# Patient Record
Sex: FEMALE | Race: WHITE | NOT HISPANIC OR LATINO | URBAN - METROPOLITAN AREA
[De-identification: names, ages, dates, MRNs, and addresses within clinical notes are randomized per-mention and may not be internally consistent; named-entity substitution may affect disease eponyms.]

---

## 2021-02-12 DIAGNOSIS — Z23 ENCOUNTER FOR IMMUNIZATION: ICD-10-CM

## 2023-06-27 ENCOUNTER — NEW PATIENT (OUTPATIENT)
Dept: URBAN - METROPOLITAN AREA CLINIC 14 | Facility: CLINIC | Age: 78
End: 2023-06-27

## 2023-06-27 DIAGNOSIS — H35.033: ICD-10-CM

## 2023-06-27 DIAGNOSIS — H35.3211: ICD-10-CM

## 2023-06-27 DIAGNOSIS — H35.3122: ICD-10-CM

## 2023-06-27 DIAGNOSIS — H43.813: ICD-10-CM

## 2023-06-27 DIAGNOSIS — D31.32: ICD-10-CM

## 2023-06-27 PROCEDURE — 92202 OPSCPY EXTND ON/MAC DRAW: CPT | Mod: 59

## 2023-06-27 PROCEDURE — 92134 CPTRZ OPH DX IMG PST SGM RTA: CPT

## 2023-06-27 PROCEDURE — 99204 OFFICE O/P NEW MOD 45 MIN: CPT | Mod: 25

## 2023-06-27 PROCEDURE — 67028 INJECTION EYE DRUG: CPT

## 2023-06-27 PROCEDURE — PFS EYLEA PFS

## 2023-06-27 ASSESSMENT — VISUAL ACUITY
OD_PAM: 20/20
OD_CC: 20/60-3
OS_AM: 20/20
OS_CC: 20/30-1

## 2023-06-27 ASSESSMENT — TONOMETRY
OS_IOP_MMHG: 19
OD_IOP_MMHG: 18

## 2023-08-04 ENCOUNTER — FOLLOW UP (OUTPATIENT)
Dept: URBAN - METROPOLITAN AREA CLINIC 14 | Facility: CLINIC | Age: 78
End: 2023-08-04

## 2023-08-04 DIAGNOSIS — D31.32: ICD-10-CM

## 2023-08-04 DIAGNOSIS — H35.033: ICD-10-CM

## 2023-08-04 DIAGNOSIS — H35.3122: ICD-10-CM

## 2023-08-04 DIAGNOSIS — H35.3211: ICD-10-CM

## 2023-08-04 PROCEDURE — PFS EYLEA PFS: Mod: JZ

## 2023-08-04 PROCEDURE — 92202 OPSCPY EXTND ON/MAC DRAW: CPT | Mod: 59

## 2023-08-04 PROCEDURE — 92014 COMPRE OPH EXAM EST PT 1/>: CPT | Mod: 25

## 2023-08-04 PROCEDURE — 67028 INJECTION EYE DRUG: CPT

## 2023-08-04 PROCEDURE — 92134 CPTRZ OPH DX IMG PST SGM RTA: CPT

## 2023-08-04 ASSESSMENT — TONOMETRY
OS_IOP_MMHG: 14
OD_IOP_MMHG: 15

## 2023-08-04 ASSESSMENT — VISUAL ACUITY
OS_CC: 20/40
OD_CC: 20/60-1

## 2023-09-01 ENCOUNTER — FOLLOW UP (OUTPATIENT)
Dept: URBAN - METROPOLITAN AREA CLINIC 14 | Facility: CLINIC | Age: 78
End: 2023-09-01

## 2023-09-01 DIAGNOSIS — H35.3122: ICD-10-CM

## 2023-09-01 DIAGNOSIS — H35.3211: ICD-10-CM

## 2023-09-01 PROCEDURE — PFS EYLEA PFS: Mod: JZ

## 2023-09-01 PROCEDURE — 92014 COMPRE OPH EXAM EST PT 1/>: CPT | Mod: 25

## 2023-09-01 PROCEDURE — 92134 CPTRZ OPH DX IMG PST SGM RTA: CPT

## 2023-09-01 PROCEDURE — 67028 INJECTION EYE DRUG: CPT

## 2023-09-01 PROCEDURE — 92202 OPSCPY EXTND ON/MAC DRAW: CPT | Mod: 59

## 2023-09-01 ASSESSMENT — VISUAL ACUITY
OD_CC: 20/60-3
OD_PH: 20/50
OS_CC: 20/40-1

## 2023-09-01 ASSESSMENT — TONOMETRY
OD_IOP_MMHG: 15
OS_IOP_MMHG: 15

## 2023-09-29 ENCOUNTER — FOLLOW UP (OUTPATIENT)
Dept: URBAN - METROPOLITAN AREA CLINIC 14 | Facility: CLINIC | Age: 78
End: 2023-09-29

## 2023-09-29 DIAGNOSIS — H35.3122: ICD-10-CM

## 2023-09-29 DIAGNOSIS — H35.3211: ICD-10-CM

## 2023-09-29 PROCEDURE — 92134 CPTRZ OPH DX IMG PST SGM RTA: CPT

## 2023-09-29 PROCEDURE — 92202 OPSCPY EXTND ON/MAC DRAW: CPT | Mod: 59

## 2023-09-29 PROCEDURE — PFS EYLEA PFS: Mod: JZ

## 2023-09-29 PROCEDURE — 67028 INJECTION EYE DRUG: CPT

## 2023-09-29 PROCEDURE — 92014 COMPRE OPH EXAM EST PT 1/>: CPT | Mod: 25

## 2023-09-29 ASSESSMENT — VISUAL ACUITY
OD_PH: 20/40
OD_CC: 20/80+2
OS_CC: 20/40-1
OS_PH: 20/30

## 2023-09-29 ASSESSMENT — TONOMETRY
OS_IOP_MMHG: 20
OD_IOP_MMHG: 17

## 2023-11-07 ENCOUNTER — FOLLOW UP (OUTPATIENT)
Dept: URBAN - METROPOLITAN AREA CLINIC 14 | Facility: CLINIC | Age: 78
End: 2023-11-07

## 2023-11-07 DIAGNOSIS — H35.3122: ICD-10-CM

## 2023-11-07 DIAGNOSIS — H35.033: ICD-10-CM

## 2023-11-07 DIAGNOSIS — H35.3211: ICD-10-CM

## 2023-11-07 PROCEDURE — HD EYLEA HD 8MG: Mod: JZ

## 2023-11-07 PROCEDURE — 92202 OPSCPY EXTND ON/MAC DRAW: CPT | Mod: 59

## 2023-11-07 PROCEDURE — 92134 CPTRZ OPH DX IMG PST SGM RTA: CPT

## 2023-11-07 PROCEDURE — 92014 COMPRE OPH EXAM EST PT 1/>: CPT | Mod: 25

## 2023-11-07 PROCEDURE — 67028 INJECTION EYE DRUG: CPT

## 2023-11-07 ASSESSMENT — TONOMETRY
OD_IOP_MMHG: 13
OS_IOP_MMHG: 14

## 2023-11-07 ASSESSMENT — VISUAL ACUITY
OS_PH: 20/30-2
OD_PH: 20/40
OD_CC: 20/60-2
OS_CC: 20/50

## 2023-12-12 ENCOUNTER — FOLLOW UP (OUTPATIENT)
Dept: URBAN - METROPOLITAN AREA CLINIC 14 | Facility: CLINIC | Age: 78
End: 2023-12-12

## 2023-12-12 DIAGNOSIS — H35.3122: ICD-10-CM

## 2023-12-12 DIAGNOSIS — H35.3211: ICD-10-CM

## 2023-12-12 DIAGNOSIS — H35.033: ICD-10-CM

## 2023-12-12 PROCEDURE — 92014 COMPRE OPH EXAM EST PT 1/>: CPT | Mod: 25

## 2023-12-12 PROCEDURE — 92202 OPSCPY EXTND ON/MAC DRAW: CPT | Mod: 59

## 2023-12-12 PROCEDURE — 92134 CPTRZ OPH DX IMG PST SGM RTA: CPT

## 2023-12-12 PROCEDURE — 67028 INJECTION EYE DRUG: CPT

## 2023-12-12 PROCEDURE — HD EYLEA HD 8MG: Mod: JZ

## 2023-12-12 ASSESSMENT — VISUAL ACUITY
OS_PH: 20/40-2
OS_CC: 20/50
OD_PH: 20/50+3
OD_CC: 20/60

## 2023-12-12 ASSESSMENT — TONOMETRY
OS_IOP_MMHG: 15
OD_IOP_MMHG: 14

## 2024-01-26 ENCOUNTER — FOLLOW UP (OUTPATIENT)
Dept: URBAN - METROPOLITAN AREA CLINIC 14 | Facility: CLINIC | Age: 79
End: 2024-01-26

## 2024-01-26 DIAGNOSIS — H35.3122: ICD-10-CM

## 2024-01-26 DIAGNOSIS — H35.3211: ICD-10-CM

## 2024-01-26 DIAGNOSIS — H35.033: ICD-10-CM

## 2024-01-26 PROCEDURE — HD EYLEA HD 8MG: Mod: JZ

## 2024-01-26 PROCEDURE — 67028 INJECTION EYE DRUG: CPT

## 2024-01-26 PROCEDURE — 92202 OPSCPY EXTND ON/MAC DRAW: CPT | Mod: 59

## 2024-01-26 PROCEDURE — 92134 CPTRZ OPH DX IMG PST SGM RTA: CPT

## 2024-01-26 PROCEDURE — 92014 COMPRE OPH EXAM EST PT 1/>: CPT | Mod: 25

## 2024-01-26 ASSESSMENT — TONOMETRY
OD_IOP_MMHG: 14
OS_IOP_MMHG: 15

## 2024-01-26 ASSESSMENT — VISUAL ACUITY
OS_PH: 20/30
OD_PH: 20/30-1
OS_CC: 20/40-1
OD_CC: 20/40-1

## 2024-03-22 ENCOUNTER — FOLLOW UP (OUTPATIENT)
Dept: URBAN - METROPOLITAN AREA CLINIC 14 | Facility: CLINIC | Age: 79
End: 2024-03-22

## 2024-03-22 DIAGNOSIS — H35.033: ICD-10-CM

## 2024-03-22 DIAGNOSIS — H35.3211: ICD-10-CM

## 2024-03-22 DIAGNOSIS — H35.3122: ICD-10-CM

## 2024-03-22 PROCEDURE — HD EYLEA HD 8MG: Mod: JZ

## 2024-03-22 PROCEDURE — 67028 INJECTION EYE DRUG: CPT

## 2024-03-22 PROCEDURE — 92134 CPTRZ OPH DX IMG PST SGM RTA: CPT

## 2024-03-22 PROCEDURE — 92014 COMPRE OPH EXAM EST PT 1/>: CPT | Mod: 25

## 2024-03-22 PROCEDURE — 92202 OPSCPY EXTND ON/MAC DRAW: CPT | Mod: 59

## 2024-03-22 ASSESSMENT — TONOMETRY
OS_IOP_MMHG: 20
OD_IOP_MMHG: 16

## 2024-03-22 ASSESSMENT — VISUAL ACUITY
OS_SC: 20/25-1
OD_SC: 20/25-2

## 2024-05-31 ENCOUNTER — FOLLOW UP (OUTPATIENT)
Dept: URBAN - METROPOLITAN AREA CLINIC 14 | Facility: CLINIC | Age: 79
End: 2024-05-31

## 2024-05-31 DIAGNOSIS — H35.033: ICD-10-CM

## 2024-05-31 DIAGNOSIS — H35.3122: ICD-10-CM

## 2024-05-31 DIAGNOSIS — H35.3211: ICD-10-CM

## 2024-05-31 PROCEDURE — 92202 OPSCPY EXTND ON/MAC DRAW: CPT | Mod: 59

## 2024-05-31 PROCEDURE — 67028 INJECTION EYE DRUG: CPT

## 2024-05-31 PROCEDURE — 92014 COMPRE OPH EXAM EST PT 1/>: CPT | Mod: 25

## 2024-05-31 PROCEDURE — 92134 CPTRZ OPH DX IMG PST SGM RTA: CPT

## 2024-05-31 ASSESSMENT — VISUAL ACUITY
OD_PH: 20/25-2
OS_SC: 20/25-1
OD_SC: 20/40-1

## 2024-05-31 ASSESSMENT — TONOMETRY
OS_IOP_MMHG: 11
OD_IOP_MMHG: 10

## 2024-08-02 ENCOUNTER — FOLLOW UP (OUTPATIENT)
Dept: URBAN - METROPOLITAN AREA CLINIC 14 | Facility: CLINIC | Age: 79
End: 2024-08-02

## 2024-08-02 DIAGNOSIS — H35.3211: ICD-10-CM

## 2024-08-02 DIAGNOSIS — H35.033: ICD-10-CM

## 2024-08-02 DIAGNOSIS — H35.3122: ICD-10-CM

## 2024-08-02 PROCEDURE — 92134 CPTRZ OPH DX IMG PST SGM RTA: CPT

## 2024-08-02 PROCEDURE — 92202 OPSCPY EXTND ON/MAC DRAW: CPT | Mod: 59

## 2024-08-02 PROCEDURE — 67028 INJECTION EYE DRUG: CPT

## 2024-08-02 PROCEDURE — 92014 COMPRE OPH EXAM EST PT 1/>: CPT | Mod: 25

## 2024-08-02 ASSESSMENT — VISUAL ACUITY
OS_SC: 20/20-4
OD_SC: 20/25
OS_PH: 20/20-2

## 2024-08-02 ASSESSMENT — TONOMETRY
OD_IOP_MMHG: 11
OS_IOP_MMHG: 10

## 2024-10-11 ENCOUNTER — FOLLOW UP (OUTPATIENT)
Dept: URBAN - METROPOLITAN AREA CLINIC 14 | Facility: CLINIC | Age: 79
End: 2024-10-11

## 2024-10-11 DIAGNOSIS — H35.3122: ICD-10-CM

## 2024-10-11 DIAGNOSIS — Z96.1: ICD-10-CM

## 2024-10-11 DIAGNOSIS — H35.3211: ICD-10-CM

## 2024-10-11 DIAGNOSIS — H35.033: ICD-10-CM

## 2024-10-11 PROCEDURE — 92134 CPTRZ OPH DX IMG PST SGM RTA: CPT

## 2024-10-11 PROCEDURE — 67028 INJECTION EYE DRUG: CPT

## 2024-10-11 PROCEDURE — 92014 COMPRE OPH EXAM EST PT 1/>: CPT | Mod: 25

## 2024-10-11 PROCEDURE — 92202 OPSCPY EXTND ON/MAC DRAW: CPT | Mod: 59

## 2024-10-11 ASSESSMENT — TONOMETRY
OD_IOP_MMHG: 13
OS_IOP_MMHG: 11

## 2024-10-11 ASSESSMENT — VISUAL ACUITY
OS_SC: 20/25
OD_SC: 20/25

## 2024-12-06 ENCOUNTER — FOLLOW UP (OUTPATIENT)
Dept: URBAN - METROPOLITAN AREA CLINIC 14 | Facility: CLINIC | Age: 79
End: 2024-12-06

## 2024-12-06 DIAGNOSIS — H35.3122: ICD-10-CM

## 2024-12-06 DIAGNOSIS — H35.033: ICD-10-CM

## 2024-12-06 DIAGNOSIS — H35.3211: ICD-10-CM

## 2024-12-06 DIAGNOSIS — Z96.1: ICD-10-CM

## 2024-12-06 PROCEDURE — 92202 OPSCPY EXTND ON/MAC DRAW: CPT | Mod: 59

## 2024-12-06 PROCEDURE — 92014 COMPRE OPH EXAM EST PT 1/>: CPT | Mod: 25

## 2024-12-06 PROCEDURE — 92134 CPTRZ OPH DX IMG PST SGM RTA: CPT

## 2024-12-06 PROCEDURE — 67028 INJECTION EYE DRUG: CPT

## 2024-12-06 ASSESSMENT — TONOMETRY
OS_IOP_MMHG: 13
OD_IOP_MMHG: 13

## 2024-12-06 ASSESSMENT — VISUAL ACUITY
OD_SC: 20/30-1
OS_PH: 20/25
OS_SC: 20/40

## 2025-01-30 ENCOUNTER — FOLLOW UP (OUTPATIENT)
Age: 80
End: 2025-01-30

## 2025-01-30 DIAGNOSIS — H35.3122: ICD-10-CM

## 2025-01-30 DIAGNOSIS — H35.3211: ICD-10-CM

## 2025-01-30 DIAGNOSIS — H04.123: ICD-10-CM

## 2025-01-30 DIAGNOSIS — H35.033: ICD-10-CM

## 2025-01-30 PROCEDURE — 92134 CPTRZ OPH DX IMG PST SGM RTA: CPT

## 2025-01-30 PROCEDURE — 92202 OPSCPY EXTND ON/MAC DRAW: CPT | Mod: 59

## 2025-01-30 PROCEDURE — 92014 COMPRE OPH EXAM EST PT 1/>: CPT | Mod: 25

## 2025-01-30 PROCEDURE — 67028 INJECTION EYE DRUG: CPT

## 2025-01-30 ASSESSMENT — VISUAL ACUITY
OS_SC: 20/30
OD_SC: 20/30

## 2025-01-30 ASSESSMENT — TONOMETRY
OD_IOP_MMHG: 19
OS_IOP_MMHG: 18

## 2025-04-10 ENCOUNTER — FOLLOW UP (OUTPATIENT)
Dept: URBAN - METROPOLITAN AREA CLINIC 99 | Age: 80
End: 2025-04-10

## 2025-04-10 DIAGNOSIS — H35.3122: ICD-10-CM

## 2025-04-10 DIAGNOSIS — H35.3211: ICD-10-CM

## 2025-04-10 DIAGNOSIS — H04.123: ICD-10-CM

## 2025-04-10 DIAGNOSIS — H35.033: ICD-10-CM

## 2025-04-10 PROCEDURE — 92134 CPTRZ OPH DX IMG PST SGM RTA: CPT

## 2025-04-10 PROCEDURE — 92014 COMPRE OPH EXAM EST PT 1/>: CPT | Mod: 25

## 2025-04-10 PROCEDURE — 67028 INJECTION EYE DRUG: CPT

## 2025-04-10 PROCEDURE — 92202 OPSCPY EXTND ON/MAC DRAW: CPT | Mod: 59

## 2025-04-10 ASSESSMENT — VISUAL ACUITY
OS_SC: 20/30
OD_SC: 20/40-1

## 2025-04-10 ASSESSMENT — TONOMETRY
OS_IOP_MMHG: 17
OD_IOP_MMHG: 16

## 2025-06-05 ENCOUNTER — FOLLOW UP (OUTPATIENT)
Dept: URBAN - METROPOLITAN AREA CLINIC 99 | Age: 80
End: 2025-06-05

## 2025-06-05 DIAGNOSIS — H35.033: ICD-10-CM

## 2025-06-05 DIAGNOSIS — H35.3211: ICD-10-CM

## 2025-06-05 DIAGNOSIS — H04.123: ICD-10-CM

## 2025-06-05 DIAGNOSIS — H35.3122: ICD-10-CM

## 2025-06-05 PROCEDURE — 92202 OPSCPY EXTND ON/MAC DRAW: CPT | Mod: 59

## 2025-06-05 PROCEDURE — 92134 CPTRZ OPH DX IMG PST SGM RTA: CPT

## 2025-06-05 PROCEDURE — 92014 COMPRE OPH EXAM EST PT 1/>: CPT | Mod: 25

## 2025-06-05 PROCEDURE — 67028 INJECTION EYE DRUG: CPT

## 2025-06-05 ASSESSMENT — TONOMETRY
OS_IOP_MMHG: 16
OD_IOP_MMHG: 16

## 2025-06-05 ASSESSMENT — VISUAL ACUITY
OD_SC: 10/200
OS_SC: 20/25

## 2025-06-26 ENCOUNTER — EVALUATION (OUTPATIENT)
Age: 80
End: 2025-06-26
Payer: MEDICARE

## 2025-06-26 DIAGNOSIS — Z96.642 AFTERCARE FOLLOWING LEFT HIP JOINT REPLACEMENT SURGERY: Primary | ICD-10-CM

## 2025-06-26 DIAGNOSIS — Z47.1 AFTERCARE FOLLOWING LEFT HIP JOINT REPLACEMENT SURGERY: Primary | ICD-10-CM

## 2025-06-26 NOTE — LETTER
2025    Maryam Mclain  [address], [address]  [facility] [address]    Patient: Megha Cuba   YOB: 1945   Date of Visit: 2025     Encounter Diagnosis     ICD-10-CM    1. Aftercare following left hip joint replacement surgery  Z47.1     Z96.642           Dear Dr. Maryam Mclain:    Thank you for your recent referral of Megha Cuba. Please review the attached evaluation summary from Megha's recent visit.     Please verify that you agree with the plan of care by signing the attached order.     If you have any questions or concerns, please do not hesitate to call.     I sincerely appreciate the opportunity to share in the care of one of your patients and hope to have another opportunity to work with you in the near future.       Sincerely,    Nica Frederick, PT      Referring Provider:      I certify that I have read the below Plan of Care and certify the need for these services furnished under this plan of treatment while under my care.                    Maryam Mclain  [address]  [facility] [address]  Via Fax: [phone]          PT Evaluation     Today's Date: 2025  Patient name: Megha Cuba  : 1945  MRN: [id number]  Referring provider: Maryam Mclain  Dx:  Encounter Diagnosis     ICD-10-CM    1. Aftercare following left hip joint replacement surgery  Z47.1     Z96.642                         Assessment  Impairments: abnormal coordination, abnormal muscle firing, abnormal muscle tone, activity intolerance, impaired physical strength and pain with function  Symptom irritability: low    Assessment details:  Megha Cuba is a 80 y.o. year old female presenting to outpatient pelvic health physical therapy with a referral from provider for aftercare following left hip joint replacement surgery. Patient is S/P L Anterior Hip Replacement on 2025.     Patient's chief complaint at this time is reduced ambulation, and stair navigation tolerance. Notes weakness and reduced ROM  of LLE. Objective measurements note reduced hip flexion, extension and abduction ROM, along with proximal hip weakness. Patient ambulates with R SPC. S/S consistent with postoperative status of left hip replacement. These deficits limit patient tolerance for ADL, specifically ambulation, stair navigation, and dressing. Relevant PMH includes: R Hip Replacement three years ago.       Patient will benefit from skilled PT services to address these deficits and improve function. Based on age and motivation, patient is a positive candidate to respond favorably to physical therapy with a good prognosis.      Patient was educated on examination and techniques, plan of care, goals of therapy, and HEP. Patient was provided an opportunity to ask any questions. Provided Pt initial HEP. Patient demonstrated and verbalized understanding. Verbally reported to hold exercises if increased pain or discomfort.  Pt will be seen 2x/week for 6-8 weeks. Patient will be re-assessed regularly in order to document progress and limit any regression. The goal of PT is to progress patient towards independence of self care management.     Understanding of Dx/Px/POC: good     Prognosis: good    Goals  In 2 weeks, patient will report a reduction in pain to < or equal to 2/10 to reduce irritability and improve function  In 2 weeks, patient will display independence with HEP  In 4 weeks, patient will improve hip abduction MMT to > 0.5 to assist with improved strength tolerance for activities  In 4 weeks, patient will display full LLE hip ROM compared to other extremity  In 6 weeks, patient will perform reciprocal stair navigation 7 inch heigh  In 8 weeks, patient will perform 5x sit to stand <12 seconds for reduced falls risk  In 8 weeks, patient will report >70% improvement in symptoms to improve function        Plan  Patient would benefit from: skilled physical therapy  Planned modality interventions: TENS    Planned therapy interventions:  "balance, motor coordination training, neuromuscular re-education, patient/caregiver education, postural training, strengthening, stretching, therapeutic activities, therapeutic exercise, functional ROM exercises, body mechanics training and breathing training    Frequency: 2x week  Plan of Care beginning date: 2025  Plan of Care expiration date: 2025  Treatment plan discussed with: patient  Plan details: Plan includes manual, modalities, therapeutic exercise, therapeutic function, balance training, Kym principles, HEP      Subjective Evaluation    History of Present Illness  Date of surgery: 2025  Mechanism of injury: Patient is S/P L NANCI on 2025.     Prior to surgery, noted significant L hip pain. Failed conservative management including steroid injections.     The surgery was performed at their surgical center in [place], [place]. Same day procedure, and patient was d/c home. Patient participated in home PT for a little less than two weeks.     Today, patient notes some discomfort. Patient reports some L hip pain, constipation, of which, patient feels constipation is influencing her discomfort.     Challenging tasks at this time include stair navigation, ambulation. Patient wishes to have \"no pain.\" Patient used to walk 3-4 miles per day and has not been able to in a long time.     Social History:   Lives in a town house - 2 steps to enter.   Patient lives independently   Occupation: Retired- [employer]     PMH:  R NANCI 3 years ago.  Cataract Surgery  and      Pain  At best pain ratin  At worst pain ratin  Quality: dull ache          Systems Review History:  Cardiovascular/pulmonary  Stent in her heart.    Integumentary    Musculoskeletal S/P L NANCI 2025   Neuromuscular System Any recent falls? Patient denies  Numbness/tingling? Numbness around incision.      Surgical history and/or abdominal surgeries:  Reviewed - [facility] 3 years ago.        Goal: Walk 3-4 miles a day.  "         Objective     Observations     Additional Observation Details  Left anterior incision: EPIC image documented with patient verbal consent.  No s/s of infection    Active Range of Motion   Left Hip   Flexion: 60 degrees   Abduction: 10 degrees     Right Hip   Flexion: 105 degrees   Abduction: 20 degrees   External rotation (90/90): 20 degrees   Internal rotation (90/90): 20 degrees     Additional Active Range of Motion Details  Held rotational ROM assessment per precautions and patient guarding.     Passive Range of Motion   Left Hip   Flexion: 70 degrees   Abduction: 15 degrees     Strength/Myotome Testing     Left Hip   Planes of Motion   Flexion: 3+  Abduction: 3+             Precautions: S/P LTHA 6/11/2025  Problem List[1]      Outcome Measure Eval Re-Eval D/C   Oswestry      PFDI      LEFI 42/96 = function     EPDS      HINA           Insurance:  [employer]/CMS Eval/ Re-eval Auth #/ Referral # Total units or visits Start date  Expiration date KX? Visit limitation?  PT only or  PT+OT? Co-Insurance   CMS 06/26/2025                         POC Start Date POC Expiration Date Signed POC?   06/26/2025 09/26/2026    POC Expiration: 08/26/2025    Diagnosis: [facility] 06/11/2025   POC expires (Date that your POC expires) Auth Status? (BOMN, approved, pending) Unit limit (Daily) Auth Start date Expiration date PT/OT + Visit Limit?   08/26/2025          Date of Service 06/26/2025        Visits Used         Visits Remaining                           Neuro Re-Ed                                                                        Ther Ex         Standing Therex         Seated Therex                           Swelling Ankle pumps over pillow elevated  2 x 20                    Proximal hip strengthening Hooklying hip abduction isometric  1 x 15     Hooklying ball squeeze   1 x 15           ROM Heel slides ROM  2 x 10 to 90 degrees                                   Ther Activity         Education POC, HEP                                    Manual Ther                                                               Modalities                           Outcome Measure                                 [1] There is no problem list on file for this patient.

## 2025-06-26 NOTE — PROGRESS NOTES
PT Evaluation     Today's Date: 2025  Patient name: Megha Cuba  : 1945  MRN: [id number]  Referring provider: Mrayam Mclain  Dx:  Encounter Diagnosis     ICD-10-CM    1. Aftercare following left hip joint replacement surgery  Z47.1     Z96.642                         Assessment  Impairments: abnormal coordination, abnormal muscle firing, abnormal muscle tone, activity intolerance, impaired physical strength and pain with function  Symptom irritability: low    Assessment details:  Megha Cuba is a 80 y.o. year old female presenting to outpatient pelvic health physical therapy with a referral from provider for aftercare following left hip joint replacement surgery. Patient is S/P L Anterior Hip Replacement on 2025.     Patient's chief complaint at this time is reduced ambulation, and stair navigation tolerance. Notes weakness and reduced ROM of LLE. Objective measurements note reduced hip flexion, extension and abduction ROM, along with proximal hip weakness. Patient ambulates with R SPC. S/S consistent with postoperative status of left hip replacement. These deficits limit patient tolerance for ADL, specifically ambulation, stair navigation, and dressing. Relevant PMH includes: R Hip Replacement three years ago.       Patient will benefit from skilled PT services to address these deficits and improve function. Based on age and motivation, patient is a positive candidate to respond favorably to physical therapy with a good prognosis.      Patient was educated on examination and techniques, plan of care, goals of therapy, and HEP. Patient was provided an opportunity to ask any questions. Provided Pt initial HEP. Patient demonstrated and verbalized understanding. Verbally reported to hold exercises if increased pain or discomfort.  Pt will be seen 2x/week for 6-8 weeks. Patient will be re-assessed regularly in order to document progress and limit any regression. The goal of PT is to progress patient  towards independence of self care management.     Understanding of Dx/Px/POC: good     Prognosis: good    Goals  In 2 weeks, patient will report a reduction in pain to < or equal to 2/10 to reduce irritability and improve function  In 2 weeks, patient will display independence with HEP  In 4 weeks, patient will improve hip abduction MMT to > 0.5 to assist with improved strength tolerance for activities  In 4 weeks, patient will display full LLE hip ROM compared to other extremity  In 6 weeks, patient will perform reciprocal stair navigation 7 inch heigh  In 8 weeks, patient will perform 5x sit to stand <12 seconds for reduced falls risk  In 8 weeks, patient will report >70% improvement in symptoms to improve function        Plan  Patient would benefit from: skilled physical therapy  Planned modality interventions: TENS    Planned therapy interventions: balance, motor coordination training, neuromuscular re-education, patient/caregiver education, postural training, strengthening, stretching, therapeutic activities, therapeutic exercise, functional ROM exercises, body mechanics training and breathing training    Frequency: 2x week  Plan of Care beginning date: 6/27/2025  Plan of Care expiration date: 9/27/2025  Treatment plan discussed with: patient  Plan details: Plan includes manual, modalities, therapeutic exercise, therapeutic function, balance training, Kym principles, HEP      Subjective Evaluation    History of Present Illness  Date of surgery: 6/11/2025  Mechanism of injury: Patient is S/P L NANCI on 06/11/2025.     Prior to surgery, noted significant L hip pain. Failed conservative management including steroid injections.     The surgery was performed at their surgical center in [place], [place]. Same day procedure, and patient was d/c home. Patient participated in home PT for a little less than two weeks.     Today, patient notes some discomfort. Patient reports some L hip pain, constipation, of which,  "patient feels constipation is influencing her discomfort.     Challenging tasks at this time include stair navigation, ambulation. Patient wishes to have \"no pain.\" Patient used to walk 3-4 miles per day and has not been able to in a long time.     Social History:   Lives in a town house - 2 steps to enter.   Patient lives independently   Occupation: Retired- [employer]     PMH:  R NANCI 3 years ago.  Cataract Surgery  and      Pain  At best pain ratin  At worst pain ratin  Quality: dull ache          Systems Review History:  Cardiovascular/pulmonary  Stent in her heart.    Integumentary    Musculoskeletal S/P L NANCI 2025   Neuromuscular System Any recent falls? Patient denies  Numbness/tingling? Numbness around incision.      Surgical history and/or abdominal surgeries:  Reviewed - [facility] 3 years ago.        Goal: Walk 3-4 miles a day.          Objective     Observations     Additional Observation Details  Left anterior incision: EPIC image documented with patient verbal consent.  No s/s of infection    Active Range of Motion   Left Hip   Flexion: 60 degrees   Abduction: 10 degrees     Right Hip   Flexion: 105 degrees   Abduction: 20 degrees   External rotation (90/90): 20 degrees   Internal rotation (90/90): 20 degrees     Additional Active Range of Motion Details  Held rotational ROM assessment per precautions and patient guarding.     Passive Range of Motion   Left Hip   Flexion: 70 degrees   Abduction: 15 degrees     Strength/Myotome Testing     Left Hip   Planes of Motion   Flexion: 3+  Abduction: 3+             Precautions: S/P LTHA 2025  Problem List[1]      Outcome Measure Eval Re-Eval D/C   Oswestry      PFDI      LEFI 42/96 = function     EPDS      HINA           Insurance:  [employer]/CMS Eval/ Re-eval Auth #/ Referral # Total units or visits Start date  Expiration date KX? Visit limitation?  PT only or  PT+OT? Co-Insurance   CMS 2025                         POC Start Date POC " Expiration Date Signed POC?   06/26/2025 09/26/2026    POC Expiration: 08/26/2025    Diagnosis: LTHA 06/11/2025   POC expires (Date that your POC expires) Auth Status? (BOMN, approved, pending) Unit limit (Daily) Auth Start date Expiration date PT/OT + Visit Limit?   08/26/2025          Date of Service 06/26/2025        Visits Used         Visits Remaining                           Neuro Re-Ed                                                                        Ther Ex         Standing Therex         Seated Therex                           Swelling Ankle pumps over pillow elevated  2 x 20                    Proximal hip strengthening Hooklying hip abduction isometric  1 x 15     Hooklying ball squeeze   1 x 15           ROM Heel slides ROM  2 x 10 to 90 degrees                                   Ther Activity         Education POC, HEP                                   Manual Ther                                                               Modalities                           Outcome Measure                                 [1] There is no problem list on file for this patient.

## 2025-06-27 PROCEDURE — 97161 PT EVAL LOW COMPLEX 20 MIN: CPT

## 2025-07-01 ENCOUNTER — OFFICE VISIT (OUTPATIENT)
Age: 80
End: 2025-07-01
Payer: MEDICARE

## 2025-07-01 DIAGNOSIS — Z96.642 AFTERCARE FOLLOWING LEFT HIP JOINT REPLACEMENT SURGERY: Primary | ICD-10-CM

## 2025-07-01 DIAGNOSIS — Z47.1 AFTERCARE FOLLOWING LEFT HIP JOINT REPLACEMENT SURGERY: Primary | ICD-10-CM

## 2025-07-01 PROCEDURE — 97140 MANUAL THERAPY 1/> REGIONS: CPT

## 2025-07-01 PROCEDURE — 97110 THERAPEUTIC EXERCISES: CPT

## 2025-07-01 NOTE — HOME EXERCISE EDUCATION
Program_ID:[id number]   Access Code: [email or web address]  URL: [email or web address]  Date: 07-  Prepared By: Nica Frederick    Program Notes    Heel slides: No more than 90 degrees for nowDiaphragm Breathing:Place hands on stomach (inhale and expand)PLace hand on ribs (inhale and expand)Laying on back: In two breaths, out one breathe  Exercises      - Supine Heel Slide with Strap - 3 x daily - 7 x weekly - 2 sets - 10 reps      - Hooklying Isometric Hip Abduction with Belt - 1 x daily - 7 x weekly - 1 sets - 10 reps - 5 hold      - Supine Hip Adduction Isometric with Ball - 1 x daily - 7 x weekly - 1 sets - 10 reps - 5 hold      - Standing Heel Raises - 1 x daily - 7 x weekly - 2 sets - 10 reps      - Supine Ankle Pumps in Elevation on Pillows - 3 x daily - 7 x weekly - 1 sets - 20 reps      - Supine Diaphragmatic Breathing - 1 x daily - 7 x weekly - 1 sets - 10 reps      - Sidelying Diaphragmatic Breathing - 1 x daily - 7 x weekly - 1 sets - 10 reps

## 2025-07-01 NOTE — PROGRESS NOTES
"Daily Note     Today's date: 2025  Patient name: Megha Cuba  : 1945  MRN: [id number]  Referring provider: Toan Dave MD  Dx:   Encounter Diagnosis     ICD-10-CM    1. Aftercare following left hip joint replacement surgery  Z47.1     Z96.642           Start Time: 1235  Stop Time: 1320  Total time in clinic (min): 45 minutes    1:1 treatment    Subjective: Patient denies any new complaints. Notes overall (+) response. Continues to ambulate with SPC in right hand and ascend stairs \"up with the good, down with the bad.\"      Objective: See treatment diary below      Assessment: Tolerated treatment well. Manual treatment progressed with patient verbal consent. Introduced PROM appropriately within post anterior NANCI protocols. Minimum # of verbal cueing required to assist with proximal hip strengthening. Introduced functional application with step up and down. Difficulty with step down requiring a lower step. Further progressed quad strengthening with short LA. Patient would benefit from continued PT to address above deficits and improve function. Updated Pt initial HEP. Patient demonstrated and verbalized understanding. Verbally reported to hold exercises if increased pain or discomfort.   Patient would benefit from continued PT      Plan: Continue per plan of care.      Precautions: S/P LTHA 2025  Problem List[1]      Outcome Measure Eval Re-Eval D/C   Oswestry      PFDI      LEFI 42/96 = function     EPDS      HINA           Insurance:  [employer]/CMS Eval/ Re-eval Auth #/ Referral # Total units or visits Start date  Expiration date KX? Visit limitation?  PT only or  PT+OT? Co-Insurance   CMS 2025                         POC Start Date POC Expiration Date Signed POC?   2025    POC Expiration: 2025    Diagnosis: LTHA 2025   POC expires (Date that your POC expires) Auth Status? (BOMN, approved, pending) Unit limit (Daily) Auth Start date Expiration date PT/OT + Visit " Limit?   08/26/2025          Date of Service 06/26/2025 07/01/2025       Visits Used 1 2       Visits Remaining                           Neuro Re-Ed                                                                        Ther Ex         Standing Therex  Standing HR  1 x 20     Standing TR  1 x 20            Seated Therex                           Swelling Ankle pumps over pillow elevated  2 x 20                    Proximal hip strengthening Hooklying hip abduction isometric  1 x 15     Hooklying ball squeeze   1 x 15    Hooklying hip abduction isometric  1 x 15     Hooklying ball squeeze   1 x 15     Hooklying BKFO with cue for small slow movement not end range  1 x 15 B/L          ROM Heel slides ROM  2 x 10 to 90 degrees Heel slides ROM  2 x 10 to 90 degrees    Heel slides abduction to midline   2  x 20        Quad  Standing TKE with ball  10 x 10 seconds    Seated LAQ  1 x 10 with ball squeeze        Functional application  Step up 6 inch  1 x 15     Step down 4 inches  1 x 10                 Ther Activity         Education POC, HEP                                   Manual Ther                                                               Modalities                           Outcome Measure                                    [1] There is no problem list on file for this patient.

## 2025-07-03 ENCOUNTER — OFFICE VISIT (OUTPATIENT)
Age: 80
End: 2025-07-03
Payer: MEDICARE

## 2025-07-03 DIAGNOSIS — Z96.642 AFTERCARE FOLLOWING LEFT HIP JOINT REPLACEMENT SURGERY: Primary | ICD-10-CM

## 2025-07-03 DIAGNOSIS — Z47.1 AFTERCARE FOLLOWING LEFT HIP JOINT REPLACEMENT SURGERY: Primary | ICD-10-CM

## 2025-07-03 PROCEDURE — 97110 THERAPEUTIC EXERCISES: CPT

## 2025-07-03 PROCEDURE — 97140 MANUAL THERAPY 1/> REGIONS: CPT

## 2025-07-03 NOTE — PROGRESS NOTES
Daily Note     Today's date: 7/3/2025  Patient name: Megha Cuba  : 1945  MRN: [id number]  Referring provider: Toan Dave MD  Dx:   Encounter Diagnosis     ICD-10-CM    1. Aftercare following left hip joint replacement surgery  Z47.1     Z96.642           Start Time: 1530  Stop Time: 1615  Total time in clinic (min): 45 minutes    Subjective: Patient notes overall increased pain, continued tenderness. Notes some LLE knee discomfort.       Objective: See treatment diary below      Assessment: Tolerated treatment well. Manual treatment progressed with patient verbal consent. Introduced gentle bridges, DF stretch supine and progressed AAROM and quad strengthening. Patient tolerated well.  Note continued proximal hip weakness. Advised patient to continue with SPC per continued (+) LLE trendelenburg and decreased LLE stance time. Patient would benefit from continued PT to address above deficits and improve function. Updated Pt initial HEP. Patient demonstrated and verbalized understanding. Verbally reported to hold exercises if increased pain or discomfort.   Patient would benefit from continued PT      Plan: Continue per plan of care.      Precautions: S/P LTHA 2025  Problem List[1]      Outcome Measure Eval Re-Eval D/C   Oswestry      PFDI      LEFI 42/96 = function     EPDS      HINA           Insurance:  [employer]/CMS Eval/ Re-eval Auth #/ Referral # Total units or visits Start date  Expiration date KX? Visit limitation?  PT only or  PT+OT? Co-Insurance   CMS 2025                         POC Start Date POC Expiration Date Signed POC?   2025    POC Expiration: 2025    Diagnosis: LTHA 2025   POC expires (Date that your POC expires) Auth Status? (BOMN, approved, pending) Unit limit (Daily) Auth Start date Expiration date PT/OT + Visit Limit?   2025          Date of Service 2025      Visits Used 1 2 3      Visits Remaining                            Neuro Re-Ed                                                                        Ther Ex         Standing Therex  Standing HR  1 x 20     Standing TR  1 x 20      Standing HR  1 x 20     Standing TR  1 x 20       Seated Therex                           Swelling Ankle pumps over pillow elevated  2 x 20                    Proximal hip strengthening Hooklying hip abduction isometric  1 x 15     Hooklying ball squeeze   1 x 15    Hooklying hip abduction isometric  1 x 15     Hooklying ball squeeze   1 x 15     Hooklying BKFO with cue for small slow movement not end range  1 x 15 B/L    Hooklying hip abduction isometric  15 x 10 sec    Hooklying ball squeeze with hold   1 x 15     Bridge  2 x 10           ROM Heel slides ROM  2 x 10 to 90 degrees Heel slides ROM  2 x 10 to 90 degrees    Heel slides abduction to midline   2  x 20  Heel slides ROM   2 x 10 to 90 degrees    Supine DF stretch LLE  1 x 15 with hold     Hip abduction to midline  2 x 20         Quad  Standing TKE with ball  10 x 10 seconds    Seated LAQ  1 x 10 with ball squeeze  Standing TKE with ball  10 x 10 seconds       Functional application  Step up 6 inch  1 x 15     Step down 4 inches  1 x 10  Step up with hold   2 x 10 6 inches               Ther Activity         Education POC, HEP                                   Manual Ther         PROM  Hip flexion, ABDuction   Performed MACARIO Performed MACARIO gentle hip flexion, abduction.                                                      Modalities                           Outcome Measure                                     [1] There is no problem list on file for this patient.

## 2025-07-08 ENCOUNTER — OFFICE VISIT (OUTPATIENT)
Age: 80
End: 2025-07-08
Payer: MEDICARE

## 2025-07-08 DIAGNOSIS — Z47.1 AFTERCARE FOLLOWING LEFT HIP JOINT REPLACEMENT SURGERY: Primary | ICD-10-CM

## 2025-07-08 DIAGNOSIS — Z96.642 AFTERCARE FOLLOWING LEFT HIP JOINT REPLACEMENT SURGERY: Primary | ICD-10-CM

## 2025-07-08 PROCEDURE — 97110 THERAPEUTIC EXERCISES: CPT

## 2025-07-08 PROCEDURE — 97140 MANUAL THERAPY 1/> REGIONS: CPT

## 2025-07-08 NOTE — PROGRESS NOTES
Daily Note     Today's date: 2025  Patient name: Megha Cuba  : 1945  MRN: [id number]  Referring provider: Toan Dave MD  Dx:   Encounter Diagnosis     ICD-10-CM    1. 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       Objective: See treatment diary below      Assessment: Tolerated treatment well. Manual treatment progressed with patient verbal consent. Introduced gentle bridges and standing mini squats to 45 degrees to assist with functional application and upright walking. Progressing hip ROM appropriately, of which noted improvement to slightly >90 degrees post appropriate PROM. Introduced step down today, of which patient tolerated well with repetition. Patient would benefit from continued PT to address above deficits and improve function. Gait training with SPC - note patient is not ready yet for no cane per reduced LLE strength.  Updated Pt initial HEP. Patient demonstrated and verbalized understanding. Verbally reported to hold exercises if increased pain or discomfort.   Patient would benefit from continued PT      Plan: Continue per plan of care.      Precautions: S/P LTHA 2025  Problem List[1]      Outcome Measure Eval Re-Eval D/C   Oswestry      PFDI      LEFI 42/96 = function     EPDS      HINA           Insurance:  [place]/CMS Eval/ Re-eval Auth #/ Referral # Total units or visits Start date  Expiration date KX? Visit limitation?  PT only or  PT+OT? Co-Insurance   CMS 2025                         POC Start Date POC Expiration Date Signed POC?   2025    POC Expiration: 2025    Diagnosis: LTHA 2025   POC expires (Date that your POC expires) Auth Status? (BOMN, approved, pending) Unit limit (Daily) Auth Start date Expiration date PT/OT + Visit Limit?   2025          Date of Service 2025  7/03/2025 7/08/2025     Visits Used 1 2 3 4     Visits Remaining                           Neuro Re-Ed                                                                        Ther Ex         Standing Therex  Standing HR  1 x 20     Standing TR  1 x 20      Standing HR  1 x 20     Standing TR  1 x 20  Standing HR  1 x 20     Standing TR  1 x 20     Mini squat to 45 degrees  2 x 10      Seated Therex                           Swelling Ankle pumps over pillow elevated  2 x 20                    Proximal hip strengthening Hooklying hip abduction isometric  1 x 15     Hooklying ball squeeze   1 x 15    Hooklying hip abduction isometric  1 x 15     Hooklying ball squeeze   1 x 15     Hooklying BKFO with cue for small slow movement not end range  1 x 15 B/L    Hooklying hip abduction isometric  15 x 10 sec    Hooklying ball squeeze with hold   1 x 15     Bridge  2 x 10      Hooklying hip abduction isometric   10 x 10 seconds    Right sidelying with pillow between knees  1 x 10 clamshell with PT assist     Bridge with hip abduction   2 x 10      ROM Heel slides ROM  2 x 10 to 90 degrees Heel slides ROM  2 x 10 to 90 degrees    Heel slides abduction to midline   2  x 20  Heel slides ROM   2 x 10 to 90 degrees    Supine DF stretch LLE  1 x 15 with hold     Hip abduction to midline  2 x 20    Heel slides ROM   1 x 15 with SOS   1 x 10 without     Hip abduction to midline   2 x 10     Hooklying physioball hip flexion   2 x 10 with SOS (legs on small red pball)     Quad  Standing TKE with ball  10 x 10 seconds    Seated LAQ  1 x 10 with ball squeeze  Standing TKE with ball  10 x 10 seconds  Standing tKE with ball  10 x 10 seconds      Functional application  Step up 6 inch  1 x 15     Step down 4 inches  1 x 10  Step up with hold   2 x 10 6 inches Step up 6inches   1 x 15     Step down   1 x 10 6 inches              Ther Activity         Education POC, HEP                                   Manual Ther         PROM  Hip flexion,  ABDuction   Performed MACARIO Performed MACARIO gentle hip flexion, abduction.    Performed MACARIO gentle hip flexion, abduction, and gentle EOB supported quad stretch just heel off table and gentle movement                                                  Modalities                           Outcome Measure                                        [1] There is no problem list on file for this patient.

## 2025-07-10 ENCOUNTER — OFFICE VISIT (OUTPATIENT)
Age: 80
End: 2025-07-10
Payer: MEDICARE

## 2025-07-10 DIAGNOSIS — Z96.642 AFTERCARE FOLLOWING LEFT HIP JOINT REPLACEMENT SURGERY: Primary | ICD-10-CM

## 2025-07-10 DIAGNOSIS — Z47.1 AFTERCARE FOLLOWING LEFT HIP JOINT REPLACEMENT SURGERY: Primary | ICD-10-CM

## 2025-07-10 PROCEDURE — 97140 MANUAL THERAPY 1/> REGIONS: CPT

## 2025-07-10 PROCEDURE — 97110 THERAPEUTIC EXERCISES: CPT

## 2025-07-10 NOTE — PROGRESS NOTES
Daily Note     Today's date: 7/10/2025  Patient name: Megha Cuba  : 1945  MRN: [id number]  Referring provider: Toan Dave MD  Dx:   Encounter Diagnosis     ICD-10-CM    1. 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      Objective: See treatment diary below      Assessment: Tolerated treatment well. Manual treatment progressed with patient verbal consent. Introduced more progressive quadricep strengthening to assist with stair navigation. Note overall reduced hip extensor strength. Advised to continue with SPC due to reduced LLE stance time tolerance. Minimum # of verbal cueing required to assist with full quad contraction. Patient would benefit from continued PT to address above deficits and improve function. Verbally reported to hold exercises if increased pain or discomfort.   Patient would benefit from continued PT      Plan: Continue per plan of care.      Precautions: S/P LTHA 2025  Problem List[1]      Outcome Measure Eval Re-Eval D/C   Oswestry      PFDI      LEFI 42/96 = function     EPDS      HINA           Insurance:  [place]/CMS Eval/ Re-eval Auth #/ Referral # Total units or visits Start date  Expiration date KX? Visit limitation?  PT only or  PT+OT? Co-Insurance   CMS 2025                         POC Start Date POC Expiration Date Signed POC?   2025    POC Expiration: 2025    Diagnosis: LTHA 2025   POC expires (Date that your POC expires) Auth Status? (BOMN, approved, pending) Unit limit (Daily) Auth Start date Expiration date PT/OT + Visit Limit?   2025          Date of Service 2025 2025 2025 2025 7/10/2025    Visits Used 1 2 3 4 5    Visits Remaining         WEEKS POST OPERATIVELY      4 weeks post operatively             Neuro Re-Ed                                                                        Ther Ex         Standing Therex  Standing HR  1 x 20     Standing TR  1 x 20      Standing HR  1 x 20     Standing TR  1 x 20  Standing HR  1 x 20     Standing TR  1 x 20     Mini squat to 45 degrees  2 x 10  Standing HR  1 x 20     Standing TR  1 x 20        Seated Therex                           Swelling Ankle pumps over pillow elevated  2 x 20                    Proximal hip strengthening Hooklying hip abduction isometric  1 x 15     Hooklying ball squeeze   1 x 15    Hooklying hip abduction isometric  1 x 15     Hooklying ball squeeze   1 x 15     Hooklying BKFO with cue for small slow movement not end range  1 x 15 B/L    Hooklying hip abduction isometric  15 x 10 sec    Hooklying ball squeeze with hold   1 x 15     Bridge  2 x 10      Hooklying hip abduction isometric   10 x 10 seconds    Right sidelying with pillow between knees  1 x 10 clamshell with PT assist     Bridge with hip abduction   2 x 10  Bridges with hip abduction   2 x 10     Right sidelying with pillow between knees  2 x 10 with PT assist    Hooklying ball squeeze  10 x 10 seconds    SAQ over bolster  1 x 12 with 5 second hold B/L    ROM Heel slides ROM  2 x 10 to 90 degrees Heel slides ROM  2 x 10 to 90 degrees    Heel slides abduction to midline   2  x 20  Heel slides ROM   2 x 10 to 90 degrees    Supine DF stretch LLE  1 x 15 with hold     Hip abduction to midline  2 x 20    Heel slides ROM   1 x 15 with SOS   1 x 10 without     Hip abduction to midline   2 x 10     Hooklying physioball hip flexion   2 x 10 with SOS (legs on small red pball) Heel slides ROM   1 x 15 with SOS   1 x 10 without     Hip abduction to midline   2 x 10     Hooklying physioball hip flexion   2 x 10 with SOS (legs on small red pball)    Long sit DF stretch  1 x 15         Quad  Standing TKE with ball  10 x 10 seconds    Seated LAQ  1 x 10 with  ball squeeze  Standing TKE with ball  10 x 10 seconds  Standing tKE with ball  10 x 10 seconds  Standing TKE with ball   10 x 10 seconds     Functional application  Step up 6 inch  1 x 15     Step down 4 inches  1 x 10  Step up with hold   2 x 10 6 inches Step up 6inches   1 x 15     Step down   1 x 10 6 inches Step up 6 inches with LLE  1 x 15     Step down   1 x 15 with LLE    Lateral Step up   1 x 15 with LLE              Ther Activity         Education POC, HEP                                   Manual Ther         PROM  Hip flexion, ABDuction   Performed MACARIO Performed MACARIO gentle hip flexion, abduction.    Performed MACARIO gentle hip flexion, abduction, and gentle EOB supported quad stretch just heel off table and gentle movement Performed MACARIO gentle hip flexion, abduction, and gentle EOB supported quad stretch just heel off table and gentle movement                                                 Modalities                           Outcome Measure                                             [1] There is no problem list on file for this patient.

## 2025-07-15 ENCOUNTER — OFFICE VISIT (OUTPATIENT)
Age: 80
End: 2025-07-15
Payer: MEDICARE

## 2025-07-15 DIAGNOSIS — Z47.1 AFTERCARE FOLLOWING LEFT HIP JOINT REPLACEMENT SURGERY: Primary | ICD-10-CM

## 2025-07-15 DIAGNOSIS — Z96.642 AFTERCARE FOLLOWING LEFT HIP JOINT REPLACEMENT SURGERY: Primary | ICD-10-CM

## 2025-07-15 PROCEDURE — 97140 MANUAL THERAPY 1/> REGIONS: CPT

## 2025-07-15 PROCEDURE — 97110 THERAPEUTIC EXERCISES: CPT

## 2025-07-17 ENCOUNTER — OFFICE VISIT (OUTPATIENT)
Age: 80
End: 2025-07-17
Payer: MEDICARE

## 2025-07-17 DIAGNOSIS — Z47.1 AFTERCARE FOLLOWING LEFT HIP JOINT REPLACEMENT SURGERY: Primary | ICD-10-CM

## 2025-07-17 DIAGNOSIS — Z96.642 AFTERCARE FOLLOWING LEFT HIP JOINT REPLACEMENT SURGERY: Primary | ICD-10-CM

## 2025-07-17 PROCEDURE — 97110 THERAPEUTIC EXERCISES: CPT

## 2025-07-17 PROCEDURE — 97140 MANUAL THERAPY 1/> REGIONS: CPT

## 2025-07-17 NOTE — PROGRESS NOTES
Daily Note     Today's date: 2025  Patient name: Megha Cuba  : 1945  MRN: [id number]  Referring provider: Toan Dave MD  Dx:   Encounter Diagnosis     ICD-10-CM    1. 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 Less days with pain, but intermittent pain with increased activity.       Objective: See treatment diary below      Assessment: Tolerated treatment well. Manual treatment progressed with patient verbal consent. Introduced lateral step up to further introduced hip abduction strengthening, of which patient tolerated well. Noted performance of proximal hip strengthening with improved control and reduced PT assist. Minimum # of verbal cueing required to assist with quad activation, noting improvement in upright posture. Patient would benefit from continued PT to address above deficits and improve function. The goal next session is to progress off cane. Updated Pt initial HEP. Patient demonstrated and verbalized understanding. Verbally reported to hold exercises if increased pain or discomfort.   Patient would benefit from continued PT      Plan: Continue per plan of care.      Precautions: S/P LTHA 2025  Problem List[1]      Outcome Measure Eval Re-Eval D/C   Oswestry      PFDI      LEFI 42/96 = function     EPDS      HINA           Insurance:  [employer]/CMS Eval/ Re-eval Auth #/ Referral # Total units or visits Start date  Expiration date KX? Visit limitation?  PT only or  PT+OT? Co-Insurance   CMS 2025                         POC Start Date POC Expiration Date Signed POC?   2025    POC Expiration: 2025    Diagnosis: LTHA 2025   POC expires (Date that your POC expires) Auth Status? (BOMN, approved, pending) Unit limit (Daily) Auth Start date Expiration date PT/OT + Visit Limit?   2025          Date of Service 2025  7/10/2025 7/15/2025 7/17/2025   Visits Used 1 2 3 4 5 6 7   Visits Remaining          WEEKS POST OPERATIVELY     4 weeks post operatively 4.5 weeks post operatively              Neuro Re-Ed      Without SPC 25 feet     Gait Training                                                                      Ther Ex          Standing Therex  Standing HR  1 x 20     Standing TR  1 x 20      Standing HR  1 x 20     Standing TR  1 x 20  Standing HR  1 x 20     Standing TR  1 x 20     Mini squat to 45 degrees  2 x 10  Standing HR  1 x 20     Standing TR  1 x 20     Standing HR  1 x 20     Standing TR  1 x 20     Mini Squat 45 degrees Standing HR  1 x 20     Standing TR  1 x 20    Seated Therex                              Swelling Ankle pumps over pillow elevated  2 x 20                      Proximal hip strengthening Hooklying hip abduction isometric  1 x 15     Hooklying ball squeeze   1 x 15    Hooklying hip abduction isometric  1 x 15     Hooklying ball squeeze   1 x 15     Hooklying BKFO with cue for small slow movement not end range  1 x 15 B/L    Hooklying hip abduction isometric  15 x 10 sec    Hooklying ball squeeze with hold   1 x 15     Bridge  2 x 10      Hooklying hip abduction isometric   10 x 10 seconds    Right sidelying with pillow between knees  1 x 10 clamshell with PT assist     Bridge with hip abduction   2 x 10  Bridges with hip abduction   2 x 10     Right sidelying with pillow between knees  2 x 10 with PT assist    Hooklying ball squeeze  10 x 10 seconds    SAQ over bolster  1 x 12 with 5 second hold B/L Bridges with hip abduction   2 x 10     Right sidelying with pillow between knees  2 x 10 with PT assist    SAQ over bolster  2 x 10 B/L #1 of RLE     Bridges with hip abduction   2 x 10     Right sidelying with pillow between knees  2 x 10 withOUT PT assist    SAQ over bolster  1 x 15 with hold B/L    ROM Heel slides ROM  2 x 10 to 90 degrees Heel slides ROM  2 x 10 to 90 degrees    Heel slides  abduction to midline   2  x 20  Heel slides ROM   2 x 10 to 90 degrees    Supine DF stretch LLE  1 x 15 with hold     Hip abduction to midline  2 x 20    Heel slides ROM   1 x 15 with SOS   1 x 10 without     Hip abduction to midline   2 x 10     Hooklying physioball hip flexion   2 x 10 with SOS (legs on small red pball) Heel slides ROM   1 x 15 with SOS   1 x 10 without     Hip abduction to midline   2 x 10     Hooklying physioball hip flexion   2 x 10 with SOS (legs on small red pball)    Long sit DF stretch  1 x 15    Heel slides with physioball   2 x 10         Quad  Standing TKE with ball  10 x 10 seconds    Seated LAQ  1 x 10 with ball squeeze  Standing TKE with ball  10 x 10 seconds  Standing tKE with ball  10 x 10 seconds  Standing TKE with ball   10 x 10 seconds  Standing TKE  With ball  12 x 10 seconds    Functional application  Step up 6 inch  1 x 15     Step down 4 inches  1 x 10  Step up with hold   2 x 10 6 inches Step up 6inches   1 x 15     Step down   1 x 10 6 inches Step up 6 inches with LLE  1 x 15     Step down   1 x 15 with LLE    Lateral Step up   1 x 15 with LLE  Step up 8 inches   1 x 15     Step up 6 inches 1 x 10     Step down 6 inch  1 x 10  Step up 8 inches   1 x 15 LLE     Step up 6 inches 1 x 10     Step down 6 inches   1 x  10 with LLE    Lateral step up   1 x 15 LLE              Ther Activity          Education POC, HEP                                       Manual Ther          PROM  Hip flexion, ABDuction   Performed MACARIO Performed MACARIO gentle hip flexion, abduction.    Performed MACARIO gentle hip flexion, abduction, and gentle EOB supported quad stretch just heel off table and gentle movement Performed MACARIO gentle hip flexion, abduction, and gentle EOB supported quad stretch just heel off table and gentle movement Performed MACARIO gentle hip flexion, abduction, and gentle EOB supported quad stretch just heel off table and gentle movement Performed MACARIO gentle hip flexion, abduction, and gentle EOB  supported quad stretch just heel off table and gentle movement                                                     Modalities                              Outcome Measure                                                     [1] There is no problem list on file for this patient.

## 2025-07-21 ENCOUNTER — OFFICE VISIT (OUTPATIENT)
Age: 80
End: 2025-07-21
Payer: MEDICARE

## 2025-07-21 DIAGNOSIS — Z96.642 AFTERCARE FOLLOWING LEFT HIP JOINT REPLACEMENT SURGERY: Primary | ICD-10-CM

## 2025-07-21 DIAGNOSIS — Z47.1 AFTERCARE FOLLOWING LEFT HIP JOINT REPLACEMENT SURGERY: Primary | ICD-10-CM

## 2025-07-21 PROCEDURE — 97110 THERAPEUTIC EXERCISES: CPT

## 2025-07-21 PROCEDURE — 97140 MANUAL THERAPY 1/> REGIONS: CPT

## 2025-07-21 NOTE — PROGRESS NOTES
Daily Note     Today's date: 2025  Patient name: Megha Cuba  : 1945  MRN: [id number]  Referring provider: Toan Dave MD  Dx:   Encounter Diagnosis     ICD-10-CM    1. 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 Patient is following up on Wednesday with her provider.       Objective: See treatment diary below      Assessment: Tolerated treatment well. Manual treatment progressed with patient verbal consent. Introduced slight progressions in strength with the addition of TB resistance for clamshells and ankle weight for SAQ to progress quad strengthening. Minimum # of verbal cueing required to assist with proximal hip strengthening. Patient would benefit from continued PT to address above deficits and improve function. Next session, PT to complete progress report per continued (+) progression.  Patient would benefit from continued PT      Plan: Continue per plan of care.      Precautions: S/P LTHA 2025  Problem List[1]      Outcome Measure Eval Re-Eval D/C   Oswestry      PFDI      LEFI 42/96 = function     EPDS      HINA           Insurance:  [employer]/CMS Eval/ Re-eval Auth #/ Referral # Total units or visits Start date  Expiration date KX? Visit limitation?  PT only or  PT+OT? Co-Insurance   CMS 2025                         POC Start Date POC Expiration Date Signed POC?   2025    POC Expiration: 2025    Diagnosis: LTHA 2025   POC expires (Date that your POC expires) Auth Status? (BOMN, approved, pending) Unit limit (Daily) Auth Start date Expiration date PT/OT + Visit Limit?   2025          Date of Service 2025 2025 2025 2025 7/10/2025 7/15/2025 2025 2025 RE-EVAL   Visits Used 1 2 3 4 5 6 7 8    Visits Remaining            WEEKS POST OPERATIVELY      4 weeks post operatively 4.5 weeks post operatively                  Neuro Re-Ed      Without SPC 25 feet   Heel to toe pattern without SPC    Gait Training                                                                                    Ther Ex            Standing Therex  Standing HR  1 x 20     Standing TR  1 x 20      Standing HR  1 x 20     Standing TR  1 x 20  Standing HR  1 x 20     Standing TR  1 x 20     Mini squat to 45 degrees  2 x 10  Standing HR  1 x 20     Standing TR  1 x 20     Standing HR  1 x 20     Standing TR  1 x 20     Mini Squat 45 degrees Standing HR  1 x 20     Standing TR  1 x 20  Standing HR  1 x 20     Standing TR  1 x 20     Mini squats   2 x 10     Seated Therex                                    Swelling Ankle pumps over pillow elevated  2 x 20                          Proximal hip strengthening Hooklying hip abduction isometric  1 x 15     Hooklying ball squeeze   1 x 15    Hooklying hip abduction isometric  1 x 15     Hooklying ball squeeze   1 x 15     Hooklying BKFO with cue for small slow movement not end range  1 x 15 B/L    Hooklying hip abduction isometric  15 x 10 sec    Hooklying ball squeeze with hold   1 x 15     Bridge  2 x 10      Hooklying hip abduction isometric   10 x 10 seconds    Right sidelying with pillow between knees  1 x 10 clamshell with PT assist     Bridge with hip abduction   2 x 10  Bridges with hip abduction   2 x 10     Right sidelying with pillow between knees  2 x 10 with PT assist    Hooklying ball squeeze  10 x 10 seconds    SAQ over bolster  1 x 12 with 5 second hold B/L Bridges with hip abduction   2 x 10     Right sidelying with pillow between knees  2 x 10 with PT assist    SAQ over bolster  2 x 10 B/L #1 of RLE     Bridges with hip abduction   2 x 10     Right sidelying with pillow between knees  2 x 10 withOUT PT assist    SAQ over bolster  1 x 15 with hold B/L  Bridges with hip abduction   2 x 10     Right sidelying with pillow between  knees  2 x 10 red TB withOUT PT assist    SAQ over bolster  2 x 10  with hold B/L #1 lb    ROM Heel slides ROM  2 x 10 to 90 degrees Heel slides ROM  2 x 10 to 90 degrees    Heel slides abduction to midline   2  x 20  Heel slides ROM   2 x 10 to 90 degrees    Supine DF stretch LLE  1 x 15 with hold     Hip abduction to midline  2 x 20    Heel slides ROM   1 x 15 with SOS   1 x 10 without     Hip abduction to midline   2 x 10     Hooklying physioball hip flexion   2 x 10 with SOS (legs on small red pball) Heel slides ROM   1 x 15 with SOS   1 x 10 without     Hip abduction to midline   2 x 10     Hooklying physioball hip flexion   2 x 10 with SOS (legs on small red pball)    Long sit DF stretch  1 x 15    Heel slides with physioball   2 x 10           Quad  Standing TKE with ball  10 x 10 seconds    Seated LAQ  1 x 10 with ball squeeze  Standing TKE with ball  10 x 10 seconds  Standing tKE with ball  10 x 10 seconds  Standing TKE with ball   10 x 10 seconds  Standing TKE  With ball  12 x 10 seconds      Functional application  Step up 6 inch  1 x 15     Step down 4 inches  1 x 10  Step up with hold   2 x 10 6 inches Step up 6inches   1 x 15     Step down   1 x 10 6 inches Step up 6 inches with LLE  1 x 15     Step down   1 x 15 with LLE    Lateral Step up   1 x 15 with LLE  Step up 8 inches   1 x 15     Step up 6 inches 1 x 10     Step down 6 inch  1 x 10  Step up 8 inches   1 x 15 LLE     Step up 6 inches 1 x 10     Step down 6 inches   1 x  10 with LLE    Lateral step up   1 x 15 LLE  Step up 8 inches   1 x 15 LLE     Step up 6 inches 1 x 10     Step down 6 inches   1 x  10 with LLE    Lateral step up   1 x 15 LLE                Ther Activity            Education POC, HEP                                               Manual Ther            PROM  Hip flexion, ABDuction   Performed MACARIO Performed MACARIO gentle hip flexion, abduction.    Performed MACARIO gentle hip flexion, abduction, and gentle EOB supported quad stretch just  heel off table and gentle movement Performed MACARIO gentle hip flexion, abduction, and gentle EOB supported quad stretch just heel off table and gentle movement Performed MACARIO gentle hip flexion, abduction, and gentle EOB supported quad stretch just heel off table and gentle movement Performed MACARIO gentle hip flexion, abduction, and gentle EOB supported quad stretch just heel off table and gentle movement Performed MACARIO gentle hip flexion, abduction, and gentle EOB supported quad stretch just heel off table and gentle movement                                                                Modalities                                    Outcome Measure                                                            [1] There is no problem list on file for this patient.

## 2025-07-23 ENCOUNTER — OFFICE VISIT (OUTPATIENT)
Age: 80
End: 2025-07-23
Payer: MEDICARE

## 2025-07-23 DIAGNOSIS — Z47.1 AFTERCARE FOLLOWING LEFT HIP JOINT REPLACEMENT SURGERY: Primary | ICD-10-CM

## 2025-07-23 DIAGNOSIS — Z96.642 AFTERCARE FOLLOWING LEFT HIP JOINT REPLACEMENT SURGERY: Primary | ICD-10-CM

## 2025-07-23 PROCEDURE — 97110 THERAPEUTIC EXERCISES: CPT

## 2025-07-23 PROCEDURE — 97140 MANUAL THERAPY 1/> REGIONS: CPT

## 2025-07-23 NOTE — PROGRESS NOTES
Daily Note     Today's date: 2025  Patient name: Megha Cuba  : 1945  MRN: [id number]  Referring provider: Toan Dave MD  Dx:   Encounter Diagnosis     ICD-10-CM    1. Aftercare following left hip joint replacement surgery  Z47.1     Z96.642           Start Time: 1225  Stop Time: 1315  Total time in clinic (min): 50 minutes    Subjective: Patient presents without new symptom report. Notes continued LLE swelling near incision.       Objective: See treatment diary below    Checked incision - noted continued healing.     Assessment: Tolerated treatment well. Manual treatment progressed with patient verbal consent. Introduced step down and lateral step up with 8 inch step, of which the patient tolerated well. Minimum # of verbal cueing required to assist with clamshell to assist with hold mechanics, noting (+) response and improved independence. . Patient would benefit from continued PT to address above deficits and improve function. Advised patient on continued use of cane until full upright posture and on gait deviation to limit LLE hip pain exacerbation.  Patient would benefit from continued PT      Plan: Continue per plan of care.      Precautions: S/P LTHA 2025  Problem List[1]      Outcome Measure Eval Re-Eval D/C   Oswestry      PFDI      LEFI 42/96 = function     EPDS      HINA           Insurance:  [employer]/CMS Eval/ Re-eval Auth #/ Referral # Total units or visits Start date  Expiration date KX? Visit limitation?  PT only or  PT+OT? Co-Insurance   CMS 2025                         POC Start Date POC Expiration Date Signed POC?   2025    POC Expiration: 2025    Diagnosis: LTHA 2025   POC expires (Date that your POC expires) Auth Status? (BOMN, approved, pending) Unit limit (Daily) Auth Start date Expiration date PT/OT + Visit Limit?   2025          Date of Service 2025 2025 2025 2025 7/10/2025 7/15/2025 2025 2025 2025    Visits Used 1 2 3 4 5 6 7 8 9   Visits Remaining            WEEKS POST OPERATIVELY     4 weeks post operatively 4.5 weeks post operatively                  Neuro Re-Ed      Without SPC 25 feet   Heel to toe pattern without SPC    Gait Training                                                                                    Ther Ex            Standing Therex  Standing HR  1 x 20     Standing TR  1 x 20      Standing HR  1 x 20     Standing TR  1 x 20  Standing HR  1 x 20     Standing TR  1 x 20     Mini squat to 45 degrees  2 x 10  Standing HR  1 x 20     Standing TR  1 x 20     Standing HR  1 x 20     Standing TR  1 x 20     Mini Squat 45 degrees Standing HR  1 x 20     Standing TR  1 x 20  Standing HR  1 x 20     Standing TR  1 x 20     Mini squats   2 x 10  Mini Squats  2 x 12   Seated Therex                                    Swelling Ankle pumps over pillow elevated  2 x 20                          Proximal hip strengthening Hooklying hip abduction isometric  1 x 15     Hooklying ball squeeze   1 x 15    Hooklying hip abduction isometric  1 x 15     Hooklying ball squeeze   1 x 15     Hooklying BKFO with cue for small slow movement not end range  1 x 15 B/L    Hooklying hip abduction isometric  15 x 10 sec    Hooklying ball squeeze with hold   1 x 15     Bridge  2 x 10      Hooklying hip abduction isometric   10 x 10 seconds    Right sidelying with pillow between knees  1 x 10 clamshell with PT assist     Bridge with hip abduction   2 x 10  Bridges with hip abduction   2 x 10     Right sidelying with pillow between knees  2 x 10 with PT assist    Hooklying ball squeeze  10 x 10 seconds    SAQ over bolster  1 x 12 with 5 second hold B/L Bridges with hip abduction   2 x 10     Right sidelying with pillow between knees  2 x 10 with PT assist    SAQ over bolster  2 x 10 B/L #1 of RLE     Bridges with hip abduction   2 x 10     Right sidelying with pillow between knees  2 x 10 withOUT PT assist    SAQ over  bolster  1 x 15 with hold B/L  Bridges with hip abduction   2 x 10     Right sidelying with pillow between knees  2 x 10 red TB withOUT PT assist    SAQ over bolster  2 x 10  with hold B/L #1 lb ridges with hip abduction   2 x 10     Right sidelying with pillow between knees  2 x 10 red TB withOUT PT assist    SAQ over bolster  2 x 10  with hold B/L #1 lb   ROM Heel slides ROM  2 x 10 to 90 degrees Heel slides ROM  2 x 10 to 90 degrees    Heel slides abduction to midline   2  x 20  Heel slides ROM   2 x 10 to 90 degrees    Supine DF stretch LLE  1 x 15 with hold     Hip abduction to midline  2 x 20    Heel slides ROM   1 x 15 with SOS   1 x 10 without     Hip abduction to midline   2 x 10     Hooklying physioball hip flexion   2 x 10 with SOS (legs on small red pball) Heel slides ROM   1 x 15 with SOS   1 x 10 without     Hip abduction to midline   2 x 10     Hooklying physioball hip flexion   2 x 10 with SOS (legs on small red pball)    Long sit DF stretch  1 x 15    Heel slides with physioball   2 x 10           Quad  Standing TKE with ball  10 x 10 seconds    Seated LAQ  1 x 10 with ball squeeze  Standing TKE with ball  10 x 10 seconds  Standing tKE with ball  10 x 10 seconds  Standing TKE with ball   10 x 10 seconds  Standing TKE  With ball  12 x 10 seconds      Functional application  Step up 6 inch  1 x 15     Step down 4 inches  1 x 10  Step up with hold   2 x 10 6 inches Step up 6inches   1 x 15     Step down   1 x 10 6 inches Step up 6 inches with LLE  1 x 15     Step down   1 x 15 with LLE    Lateral Step up   1 x 15 with LLE  Step up 8 inches   1 x 15     Step up 6 inches 1 x 10     Step down 6 inch  1 x 10  Step up 8 inches   1 x 15 LLE     Step up 6 inches 1 x 10     Step down 6 inches   1 x  10 with LLE    Lateral step up   1 x 15 LLE  Step up 8 inches   1 x 15 LLE     Step up 6 inches 1 x 10     Step down 6 inches   1 x  10 with LLE    Lateral step up   1 x 15 LLE Step up 8 inches  1 x 10     Step  down 8 inches  1  x10     Lateral step up 8 inches  1 x 10                  Ther Activity            Education POC, HEP                                               Manual Ther            PROM  Hip flexion, ABDuction   Performed MACARIO Performed  gentle hip flexion, abduction.    Performed  gentle hip flexion, abduction, and gentle EOB supported quad stretch just heel off table and gentle movement Performed  gentle hip flexion, abduction, and gentle EOB supported quad stretch just heel off table and gentle movement Performed  gentle hip flexion, abduction, and gentle EOB supported quad stretch just heel off table and gentle movement Performed  gentle hip flexion, abduction, and gentle EOB supported quad stretch just heel off table and gentle movement Performed  gentle hip flexion, abduction, and gentle EOB supported quad stretch just heel off table and gentle movement Performed  gentle hip flexion, abduction, and gentle EOB supported quad stretch just heel off table and gentle movement                                                               Modalities                                    Outcome Measure                                                             [1] There is no problem list on file for this patient.

## 2025-07-29 ENCOUNTER — OFFICE VISIT (OUTPATIENT)
Age: 80
End: 2025-07-29
Payer: MEDICARE

## 2025-07-29 DIAGNOSIS — Z47.1 AFTERCARE FOLLOWING LEFT HIP JOINT REPLACEMENT SURGERY: Primary | ICD-10-CM

## 2025-07-29 DIAGNOSIS — Z96.642 AFTERCARE FOLLOWING LEFT HIP JOINT REPLACEMENT SURGERY: Primary | ICD-10-CM

## 2025-07-29 PROCEDURE — 97110 THERAPEUTIC EXERCISES: CPT

## 2025-07-31 ENCOUNTER — APPOINTMENT (OUTPATIENT)
Age: 80
End: 2025-07-31
Payer: MEDICARE

## 2025-08-01 ENCOUNTER — OFFICE VISIT (OUTPATIENT)
Age: 80
End: 2025-08-01
Payer: MEDICARE

## 2025-08-01 DIAGNOSIS — Z96.642 AFTERCARE FOLLOWING LEFT HIP JOINT REPLACEMENT SURGERY: Primary | ICD-10-CM

## 2025-08-01 DIAGNOSIS — Z47.1 AFTERCARE FOLLOWING LEFT HIP JOINT REPLACEMENT SURGERY: Primary | ICD-10-CM

## 2025-08-01 PROCEDURE — 97110 THERAPEUTIC EXERCISES: CPT

## 2025-08-01 PROCEDURE — 97140 MANUAL THERAPY 1/> REGIONS: CPT

## 2025-08-05 ENCOUNTER — OFFICE VISIT (OUTPATIENT)
Age: 80
End: 2025-08-05
Payer: MEDICARE

## 2025-08-05 DIAGNOSIS — Z47.1 AFTERCARE FOLLOWING LEFT HIP JOINT REPLACEMENT SURGERY: Primary | ICD-10-CM

## 2025-08-05 DIAGNOSIS — Z96.642 AFTERCARE FOLLOWING LEFT HIP JOINT REPLACEMENT SURGERY: Primary | ICD-10-CM

## 2025-08-05 PROCEDURE — 97110 THERAPEUTIC EXERCISES: CPT

## 2025-08-05 PROCEDURE — 97140 MANUAL THERAPY 1/> REGIONS: CPT

## 2025-08-07 ENCOUNTER — OFFICE VISIT (OUTPATIENT)
Age: 80
End: 2025-08-07
Payer: MEDICARE

## 2025-08-07 DIAGNOSIS — Z47.1 AFTERCARE FOLLOWING LEFT HIP JOINT REPLACEMENT SURGERY: Primary | ICD-10-CM

## 2025-08-07 DIAGNOSIS — Z96.642 AFTERCARE FOLLOWING LEFT HIP JOINT REPLACEMENT SURGERY: Primary | ICD-10-CM

## 2025-08-07 PROCEDURE — 97110 THERAPEUTIC EXERCISES: CPT

## 2025-08-12 ENCOUNTER — OFFICE VISIT (OUTPATIENT)
Age: 80
End: 2025-08-12
Payer: MEDICARE

## 2025-08-14 ENCOUNTER — OFFICE VISIT (OUTPATIENT)
Age: 80
End: 2025-08-14
Payer: MEDICARE

## 2025-08-14 ENCOUNTER — FOLLOW UP (OUTPATIENT)
Dept: URBAN - METROPOLITAN AREA CLINIC 99 | Age: 80
End: 2025-08-14

## 2025-08-14 DIAGNOSIS — H35.3122: ICD-10-CM

## 2025-08-14 DIAGNOSIS — H35.3114: ICD-10-CM

## 2025-08-14 DIAGNOSIS — H35.3211: ICD-10-CM

## 2025-08-14 DIAGNOSIS — H35.033: ICD-10-CM

## 2025-08-14 PROCEDURE — 92202 OPSCPY EXTND ON/MAC DRAW: CPT | Mod: 59

## 2025-08-14 PROCEDURE — 92014 COMPRE OPH EXAM EST PT 1/>: CPT | Mod: 25

## 2025-08-14 PROCEDURE — 92134 CPTRZ OPH DX IMG PST SGM RTA: CPT

## 2025-08-14 PROCEDURE — 67028 INJECTION EYE DRUG: CPT

## 2025-08-14 ASSESSMENT — TONOMETRY
OS_IOP_MMHG: 18
OD_IOP_MMHG: 16

## 2025-08-14 ASSESSMENT — VISUAL ACUITY
OS_SC: 20/40-1
OS_PH: 20/30
OD_SC: 20/50-2

## 2025-08-19 ENCOUNTER — OFFICE VISIT (OUTPATIENT)
Age: 80
End: 2025-08-19
Payer: MEDICARE

## 2025-08-19 DIAGNOSIS — Z47.1 AFTERCARE FOLLOWING LEFT HIP JOINT REPLACEMENT SURGERY: Primary | ICD-10-CM

## 2025-08-19 DIAGNOSIS — Z96.642 AFTERCARE FOLLOWING LEFT HIP JOINT REPLACEMENT SURGERY: Primary | ICD-10-CM

## 2025-08-19 PROCEDURE — 97140 MANUAL THERAPY 1/> REGIONS: CPT

## 2025-08-19 PROCEDURE — 97110 THERAPEUTIC EXERCISES: CPT

## 2025-08-21 ENCOUNTER — OFFICE VISIT (OUTPATIENT)
Age: 80
End: 2025-08-21
Payer: MEDICARE

## 2025-08-21 DIAGNOSIS — Z96.642 AFTERCARE FOLLOWING LEFT HIP JOINT REPLACEMENT SURGERY: Primary | ICD-10-CM

## 2025-08-21 DIAGNOSIS — Z47.1 AFTERCARE FOLLOWING LEFT HIP JOINT REPLACEMENT SURGERY: Primary | ICD-10-CM

## 2025-08-21 PROCEDURE — 97140 MANUAL THERAPY 1/> REGIONS: CPT

## 2025-08-21 PROCEDURE — 97110 THERAPEUTIC EXERCISES: CPT
